# Patient Record
Sex: MALE | Race: ASIAN | NOT HISPANIC OR LATINO | URBAN - METROPOLITAN AREA
[De-identification: names, ages, dates, MRNs, and addresses within clinical notes are randomized per-mention and may not be internally consistent; named-entity substitution may affect disease eponyms.]

---

## 2018-11-01 ENCOUNTER — OUTPATIENT (OUTPATIENT)
Dept: OUTPATIENT SERVICES | Facility: HOSPITAL | Age: 38
LOS: 1 days | End: 2018-11-01
Payer: MEDICAID

## 2018-11-01 PROCEDURE — G9001: CPT

## 2018-11-08 ENCOUNTER — EMERGENCY (EMERGENCY)
Facility: HOSPITAL | Age: 38
LOS: 1 days | Discharge: HOME | End: 2018-11-08
Attending: EMERGENCY MEDICINE | Admitting: EMERGENCY MEDICINE
Payer: MEDICAID

## 2018-11-08 VITALS
TEMPERATURE: 98 F | OXYGEN SATURATION: 100 % | HEART RATE: 60 BPM | RESPIRATION RATE: 18 BRPM | DIASTOLIC BLOOD PRESSURE: 84 MMHG | SYSTOLIC BLOOD PRESSURE: 136 MMHG

## 2018-11-08 VITALS
RESPIRATION RATE: 17 BRPM | DIASTOLIC BLOOD PRESSURE: 85 MMHG | SYSTOLIC BLOOD PRESSURE: 145 MMHG | TEMPERATURE: 98 F | OXYGEN SATURATION: 99 % | HEART RATE: 70 BPM

## 2018-11-08 LAB
ALBUMIN SERPL ELPH-MCNC: 4.6 G/DL — SIGNIFICANT CHANGE UP (ref 3.3–5)
ALP SERPL-CCNC: 74 U/L — SIGNIFICANT CHANGE UP (ref 40–120)
ALT FLD-CCNC: 42 U/L — HIGH (ref 4–41)
APTT BLD: 32.4 SEC — SIGNIFICANT CHANGE UP (ref 27.5–36.3)
AST SERPL-CCNC: 26 U/L — SIGNIFICANT CHANGE UP (ref 4–40)
BASOPHILS # BLD AUTO: 0.05 K/UL — SIGNIFICANT CHANGE UP (ref 0–0.2)
BASOPHILS NFR BLD AUTO: 0.5 % — SIGNIFICANT CHANGE UP (ref 0–2)
BILIRUB SERPL-MCNC: 0.3 MG/DL — SIGNIFICANT CHANGE UP (ref 0.2–1.2)
BUN SERPL-MCNC: 15 MG/DL — SIGNIFICANT CHANGE UP (ref 7–23)
CALCIUM SERPL-MCNC: 9.8 MG/DL — SIGNIFICANT CHANGE UP (ref 8.4–10.5)
CHLORIDE SERPL-SCNC: 103 MMOL/L — SIGNIFICANT CHANGE UP (ref 98–107)
CO2 SERPL-SCNC: 26 MMOL/L — SIGNIFICANT CHANGE UP (ref 22–31)
CREAT SERPL-MCNC: 1.06 MG/DL — SIGNIFICANT CHANGE UP (ref 0.5–1.3)
EOSINOPHIL # BLD AUTO: 0.31 K/UL — SIGNIFICANT CHANGE UP (ref 0–0.5)
EOSINOPHIL NFR BLD AUTO: 3.3 % — SIGNIFICANT CHANGE UP (ref 0–6)
GLUCOSE SERPL-MCNC: 81 MG/DL — SIGNIFICANT CHANGE UP (ref 70–99)
HCT VFR BLD CALC: 46 % — SIGNIFICANT CHANGE UP (ref 39–50)
HGB BLD-MCNC: 15 G/DL — SIGNIFICANT CHANGE UP (ref 13–17)
IMM GRANULOCYTES # BLD AUTO: 0.03 # — SIGNIFICANT CHANGE UP
IMM GRANULOCYTES NFR BLD AUTO: 0.3 % — SIGNIFICANT CHANGE UP (ref 0–1.5)
INR BLD: 0.93 — SIGNIFICANT CHANGE UP (ref 0.88–1.17)
LYMPHOCYTES # BLD AUTO: 3.13 K/UL — SIGNIFICANT CHANGE UP (ref 1–3.3)
LYMPHOCYTES # BLD AUTO: 33.8 % — SIGNIFICANT CHANGE UP (ref 13–44)
MCHC RBC-ENTMCNC: 28.4 PG — SIGNIFICANT CHANGE UP (ref 27–34)
MCHC RBC-ENTMCNC: 32.6 % — SIGNIFICANT CHANGE UP (ref 32–36)
MCV RBC AUTO: 87 FL — SIGNIFICANT CHANGE UP (ref 80–100)
MONOCYTES # BLD AUTO: 0.61 K/UL — SIGNIFICANT CHANGE UP (ref 0–0.9)
MONOCYTES NFR BLD AUTO: 6.6 % — SIGNIFICANT CHANGE UP (ref 2–14)
NEUTROPHILS # BLD AUTO: 5.13 K/UL — SIGNIFICANT CHANGE UP (ref 1.8–7.4)
NEUTROPHILS NFR BLD AUTO: 55.5 % — SIGNIFICANT CHANGE UP (ref 43–77)
NRBC # FLD: 0 — SIGNIFICANT CHANGE UP
PLATELET # BLD AUTO: 207 K/UL — SIGNIFICANT CHANGE UP (ref 150–400)
PMV BLD: 10.7 FL — SIGNIFICANT CHANGE UP (ref 7–13)
POTASSIUM SERPL-MCNC: 4.2 MMOL/L — SIGNIFICANT CHANGE UP (ref 3.5–5.3)
POTASSIUM SERPL-SCNC: 4.2 MMOL/L — SIGNIFICANT CHANGE UP (ref 3.5–5.3)
PROT SERPL-MCNC: 8 G/DL — SIGNIFICANT CHANGE UP (ref 6–8.3)
PROTHROM AB SERPL-ACNC: 10.6 SEC — SIGNIFICANT CHANGE UP (ref 9.8–13.1)
RBC # BLD: 5.29 M/UL — SIGNIFICANT CHANGE UP (ref 4.2–5.8)
RBC # FLD: 12.9 % — SIGNIFICANT CHANGE UP (ref 10.3–14.5)
SODIUM SERPL-SCNC: 139 MMOL/L — SIGNIFICANT CHANGE UP (ref 135–145)
TROPONIN T, HIGH SENSITIVITY: < 6 NG/L — SIGNIFICANT CHANGE UP (ref ?–14)
WBC # BLD: 9.26 K/UL — SIGNIFICANT CHANGE UP (ref 3.8–10.5)
WBC # FLD AUTO: 9.26 K/UL — SIGNIFICANT CHANGE UP (ref 3.8–10.5)

## 2018-11-08 PROCEDURE — 71046 X-RAY EXAM CHEST 2 VIEWS: CPT | Mod: 26

## 2018-11-08 PROCEDURE — 93010 ELECTROCARDIOGRAM REPORT: CPT

## 2018-11-08 PROCEDURE — 99284 EMERGENCY DEPT VISIT MOD MDM: CPT | Mod: 25

## 2018-11-08 RX ORDER — IBUPROFEN 200 MG
600 TABLET ORAL ONCE
Qty: 0 | Refills: 0 | Status: COMPLETED | OUTPATIENT
Start: 2018-11-08 | End: 2018-11-08

## 2018-11-08 RX ORDER — ASPIRIN/CALCIUM CARB/MAGNESIUM 324 MG
162 TABLET ORAL ONCE
Qty: 0 | Refills: 0 | Status: COMPLETED | OUTPATIENT
Start: 2018-11-08 | End: 2018-11-08

## 2018-11-08 RX ADMIN — Medication 600 MILLIGRAM(S): at 11:37

## 2018-11-08 RX ADMIN — Medication 162 MILLIGRAM(S): at 11:37

## 2018-11-08 NOTE — ED PROVIDER NOTE - MEDICAL DECISION MAKING DETAILS
Cabot: 38M with no PMH/PSH who comes in with L sided chest/rib pain since yesterday.  Has had this issue before weeks ago, but was not worked up for it, and it is now worse.  No F/C/cough, no N/V/diaph, no prior PE/DVT, LE edema, immob, cancers.  On exam, HDS, NAD, MMM, eyes clear, lungs CTAB, TTP at L anterior lower ribs, no rash, heart sounds normal, abd soft, NT, ND, no CVAT, LEs without edema, wwp, skin normal temperature and color, neuro: alert and oriented, no focal deficits.  Will check basic labs, trops, CXR, EKG, give ASA, reassess.

## 2018-11-08 NOTE — ED PROVIDER NOTE - NSFOLLOWUPINSTRUCTIONS_ED_ALL_ED_FT
You have been seen for L sided rib pain.  We did not see any sign of fracture or rash, and your EKG and labs were normal.  It is unclear what is causing your pain at this time.  Please take tylenol and advil for pain, and you may apply ice to the area.    Also, it is VERY important that you call the cardiology office today to make an appointment to be seen as soon as possible.  Call [phone] to schedule an appointment ASAP. Most patients can be seen within 48 hours. Mention that you were just discharged from the emergency room and need to be seen immediately.     Please come back to the ED if you experience worsening pain, fever, loss of consciousness, or other concerns.

## 2018-11-08 NOTE — ED PROVIDER NOTE - PHYSICAL EXAMINATION
On exam, HDS, NAD, MMM, eyes clear, lungs CTAB, TTP at L anterior lower ribs, no rash, heart sounds normal, abd soft, NT, ND, no CVAT, LEs without edema, wwp, skin normal temperature and color, neuro: alert and oriented, no focal deficits.

## 2018-11-08 NOTE — ED PROVIDER NOTE - OBJECTIVE STATEMENT
39 y/o M w/ no pertinent PMH presents to ED c/o L sided rib pain rated 8/10 in severity s/p cleaning yesterday. Pt states the pain is worsened with palpation. Pain is not worsened with taking a deep breathing or walking. Pt states he felt a similar pain several weeks ago, which then subsided. Pt states this pain is worse in severity than the one he felt previously.  Denies any family history of cardiac diseases, or any other acute complaints. NKDA. 37 y/o M w/ no pertinent PMH presents to ED c/o L sided rib pain rated 8/10 in severity s/p cleaning yesterday. Pt states the pain is worsened with palpation. Pain is not worsened with taking a deep breath or walking. Pt states he felt a similar pain several weeks ago, which then subsided. Pt states this pain is worse in severity than the one he felt previously.  Pt notes to a productive cough, but denies any family history of cardiac diseases, history of blood clots in legs or lungs,  fevers, chills, or any other acute complaints. NKDA. 39 y/o Hungarian M w/ no pertinent PMH presents to ED c/o L sided rib pain rated 8/10 in severity since yesterday. Pt states the pain is worsened with palpation. Not pleuritic or exertional.  No N/V/diaph.  No F/C/cough.  Pain is not worsened with taking a deep breath or walking. Pt states he felt a similar pain several weeks ago, which then subsided. Pt states this pain is worse in severity than the one he felt previously.  Denies any family history of cardiac diseases, history of blood clots in legs or lungs,  fevers, chills, or any other acute complaints. NKDA. 39 y/o Turkish M w/ no pertinent PMH, former smoker - quit 6 years ago - who presents to ED c/o L sided rib pain rated 8/10 in severity constantly since yesterday. Pt states the pain is worsened with palpation. Not pleuritic or exertional.  No N/V/diaph.  No F/C/cough.  Pain is not worsened with taking a deep breath or walking. Pt states he felt a similar pain several weeks ago, which then subsided. Pt states this pain is worse in severity than the one he felt previously.  Denies any family history of cardiac diseases, history of blood clots in legs or lungs,  fevers, chills, or any other acute complaints. NKDA.

## 2018-11-08 NOTE — ED PROVIDER NOTE - ATTENDING CONTRIBUTION TO CARE
I, Jennifer Cabot, MD, have performed a history and physical exam of the patient and discussed their management with the ACP.  I reviewed the PA's note and agree with the documented findings and plan of care. My medical decision making and observations are found above.    Cabot: 38M with no PMH/PSH who comes in with L sided chest/rib pain since yesterday.  Has had this issue before weeks ago, but was not worked up for it, and it is now worse.  No F/C/cough, no N/V/diaph, no prior PE/DVT, LE edema, immob, cancers.  On exam, HDS, NAD, MMM, eyes clear, lungs CTAB, TTP at L anterior lower ribs, no rash, heart sounds normal, abd soft, NT, ND, no CVAT, LEs without edema, wwp, skin normal temperature and color, neuro: alert and oriented, no focal deficits.  Will check basic labs, trops, CXR, EKG, give ASA, reassess.

## 2018-11-14 DIAGNOSIS — Z71.89 OTHER SPECIFIED COUNSELING: ICD-10-CM

## 2019-11-21 ENCOUNTER — EMERGENCY (EMERGENCY)
Facility: HOSPITAL | Age: 39
LOS: 1 days | Discharge: HOME | End: 2019-11-21
Admitting: EMERGENCY MEDICINE
Payer: MEDICAID

## 2019-11-21 VITALS
HEART RATE: 75 BPM | TEMPERATURE: 98 F | DIASTOLIC BLOOD PRESSURE: 76 MMHG | SYSTOLIC BLOOD PRESSURE: 121 MMHG | RESPIRATION RATE: 18 BRPM

## 2019-11-21 VITALS
OXYGEN SATURATION: 100 % | HEART RATE: 66 BPM | RESPIRATION RATE: 18 BRPM | DIASTOLIC BLOOD PRESSURE: 76 MMHG | SYSTOLIC BLOOD PRESSURE: 120 MMHG

## 2019-11-21 PROCEDURE — 71046 X-RAY EXAM CHEST 2 VIEWS: CPT | Mod: 26

## 2019-11-21 PROCEDURE — 99284 EMERGENCY DEPT VISIT MOD MDM: CPT

## 2019-11-21 RX ORDER — IPRATROPIUM/ALBUTEROL SULFATE 18-103MCG
3 AEROSOL WITH ADAPTER (GRAM) INHALATION
Refills: 0 | Status: COMPLETED | OUTPATIENT
Start: 2019-11-21 | End: 2019-11-21

## 2019-11-21 RX ADMIN — Medication 100 MILLIGRAM(S): at 11:32

## 2019-11-21 RX ADMIN — Medication 50 MILLIGRAM(S): at 11:33

## 2019-11-21 RX ADMIN — Medication 3 MILLILITER(S): at 11:55

## 2019-11-21 RX ADMIN — Medication 3 MILLILITER(S): at 11:32

## 2019-11-21 RX ADMIN — Medication 3 MILLILITER(S): at 12:25

## 2019-11-21 NOTE — ED PROVIDER NOTE - PROGRESS NOTE DETAILS
PA Flannery- Patient reassessed s/p 1 neb wheeze already improved, only slight Rt lung base wheeze still noted. Will give another neb in 20 mins, will continue with cxr and reassess. PA Flannery- Lungs improved, patient states sx improved. CXR normal. Pt states he already has nebulizer solution at home. Will dc home with prednisone, tessalon pearles. Pt given allergy and pulm follow up advised to f/u with PCP also. All questions and concerns addressed. Strict return instructions given.

## 2019-11-21 NOTE — ED ADULT TRIAGE NOTE - CHIEF COMPLAINT QUOTE
Pt c/o cough and allergies x3 months--pt states he takes medicine but its not helping  Pt also c/o upper back pain

## 2019-11-21 NOTE — ED PROVIDER NOTE - NSFOLLOWUPCLINICS_GEN_ALL_ED_FT
[facility] Pulmonolgy and Sleep Medicine  Pulmonology  [address], [address]  [place] [address]  Phone: [phone]  Fax:   Follow Up Time: 1-3 Days    [facility] Allergy and Immunology  Allergy  [address] [place] [address]  Phone: [phone]  Fax:   Follow Up Time: 1-3 Days

## 2019-11-21 NOTE — ED PROVIDER NOTE - CLINICAL SUMMARY MEDICAL DECISION MAKING FREE TEXT BOX
Patient is a 39 y.o male with PMHx of allergies, former smoker who presents to ED c/o intermittent cough x 2.5 months along with post nasal drip and "chest congestion". DDx- allergies, asthma, bronchitis will r/o pna. Plan- nebs, cxr, prednisone, tessalon pearles.

## 2019-11-21 NOTE — ED PROVIDER NOTE - PHYSICAL EXAMINATION
Vital signs reviewed.   CONSTITUTIONAL: Well-appearing; well-nourished; in no apparent distress. Non-toxic appearing.   HEAD: Normocephalic, atraumatic.  EYES: PERRL, EOM intact, conjunctiva and sclera WNL.  ENT: normal nose; no rhinorrhea; normal pharynx with no tonsillar hypertrophy, no erythema, no exudate, no lymphadenopathy.  NECK/LYMPH: Supple; non-tender; no cervical lymphadenopathy.  CARD: Normal S1, S2; no murmurs, rubs, or gallops noted.  RESP: Normal chest excursion with respiration; +wheeze noted worse on Rt side than Lt.   EXT/MS: moves all extremities; distal pulses are normal, no pedal edema. No calf tenderness noted.   SKIN: Normal for age and race; warm; dry; good turgor; no apparent lesions or exudate noted.  NEURO: Awake, alert, oriented x 3, no gross deficits  PSYCH: Normal mood; appropriate affect.

## 2019-11-21 NOTE — ED ADULT NURSE NOTE - OBJECTIVE STATEMENT
Pt received in intake 7.  Pt is AOX4, skin warm, dry and intact.  c/o cough x few days.  Mild expiratory wheezing noted to lower lobes.  Peak Flow 250.  Pt medicated, currently on HFN. Continue to monitor.

## 2019-11-21 NOTE — ED PROVIDER NOTE - OBJECTIVE STATEMENT
HPI: Patient is a 39 y.o male with PMHx of allergies, former smoker who presents to ED c/o intermittent cough x 2.5 months along with post nasal drip and "chest congestion". Pt states that he has hx of allergies to pollen and dust, initially had a cough and congestion, saw his PCP who started him on allergy medicine (singular)  and nasal spray. States he had some relief but cough continued, states he returned to his PCP office as he was having some chest congestion, felt like "asthma" and was given an inhaler which he states provided relief of sx but still feels he is wheezing and has cough. Pt does note some sick contacts at home with cough as well. Pt denies chest pain. Denies LE swelling, recent travel, n/v/d, fevers, chills, back pain, abdominal pain, calf pain or any other complaints.

## 2019-11-21 NOTE — ED ADULT NURSE NOTE - NSIMPLEMENTINTERV_GEN_ALL_ED
Implemented All Universal Safety Interventions:  [place] to call system. Call bell, personal items and telephone within reach. Instruct patient to call for assistance. Room bathroom lighting operational. Non-slip footwear when patient is off stretcher. Physically safe environment: no spills, clutter or unnecessary equipment. Stretcher in lowest position, wheels locked, appropriate side rails in place.

## 2019-11-21 NOTE — ED PROVIDER NOTE - PATIENT PORTAL LINK FT
You can access the FollowMyHealth Patient Portal offered by [facility] by registering at the following website: [email or web address]. By joining [employer]’s FollowMyHealth portal, you will also be able to view your health information using other applications (apps) compatible with our system.

## 2020-01-22 NOTE — ED ADULT NURSE NOTE - CHIEF COMPLAINT QUOTE
pt c/o L sided rib pain after cleaning. pain noted to be worse with ROM and palpation. denies CP.
bladder urine for cx

## 2020-01-25 PROBLEM — Z00.00 ENCOUNTER FOR PREVENTIVE HEALTH EXAMINATION: Status: ACTIVE | Noted: 2020-01-25

## 2020-01-28 ENCOUNTER — EMERGENCY (EMERGENCY)
Facility: HOSPITAL | Age: 40
LOS: 1 days | Discharge: HOME | End: 2020-01-28
Admitting: EMERGENCY MEDICINE
Payer: MEDICAID

## 2020-01-28 VITALS
DIASTOLIC BLOOD PRESSURE: 86 MMHG | OXYGEN SATURATION: 98 % | HEART RATE: 91 BPM | TEMPERATURE: 99 F | SYSTOLIC BLOOD PRESSURE: 135 MMHG | RESPIRATION RATE: 18 BRPM

## 2020-01-28 LAB
FLU A RESULT: DETECTED — HIGH
FLU A RESULT: DETECTED — HIGH
FLUAV AG NPH QL: DETECTED — HIGH
FLUBV AG NPH QL: NOT DETECTED — SIGNIFICANT CHANGE UP
RSV RESULT: SIGNIFICANT CHANGE UP
RSV RNA RESP QL NAA+PROBE: SIGNIFICANT CHANGE UP

## 2020-01-28 PROCEDURE — 71046 X-RAY EXAM CHEST 2 VIEWS: CPT | Mod: 26

## 2020-01-28 PROCEDURE — 99283 EMERGENCY DEPT VISIT LOW MDM: CPT

## 2020-01-28 NOTE — ED ADULT TRIAGE NOTE - CHIEF COMPLAINT QUOTE
Patient complains of sore throat, fevers, chills, cough, headaches & and body aches. Also reports both eyes feel "burning", appear reddened but no discharge. Took Tylenol last this morning.  States his daughter had the flu 2 weeks ago.  Vitals stable, no apparent distress noted.  Mask applied.

## 2020-01-28 NOTE — ED PROVIDER NOTE - NSFOLLOWUPINSTRUCTIONS_ED_ALL_ED_FT
Advance activity as tolerated.  Continue all previously prescribed medications as directed unless otherwise instructed.  Follow up with your primary care physician in 48-72 hours- bring copies of your results.  Return to the ER for worsening or persistent symptoms, and/or ANY NEW OR CONCERNING SYMPTOMS. If you have issues obtaining follow up, please call: [phone] to obtain a doctor or specialist who takes your insurance in your area.  You may call [phone] to make an appointment with the internal medicine clinic.

## 2020-01-28 NOTE — ED PROVIDER NOTE - PATIENT PORTAL LINK FT
You can access the FollowMyHealth Patient Portal offered by [facility] by registering at the following website: [email or web address]. By joining [employer]’s FollowMyHealth portal, you will also be able to view your health information using other applications (apps) compatible with our system.

## 2020-01-28 NOTE — ED PROVIDER NOTE - OBJECTIVE STATEMENT
40 y/o M denies pmh c/o fever, lethargy, nonproductive cough, sore throat x yesterday. Pt reports a nonproductive cough x 3 months, began to get worse yesterday. Fever of 101F at home, took tylenol with relief. Reports sick contact with daughter who had the flu about 2 weeks ago. Denies recent travel, cp, sob, abd pain, n/v/d.

## 2020-01-30 ENCOUNTER — APPOINTMENT (OUTPATIENT)
Dept: PEDIATRIC ALLERGY IMMUNOLOGY | Facility: CLINIC | Age: 40
End: 2020-01-30
Payer: MEDICAID

## 2020-01-30 VITALS
SYSTOLIC BLOOD PRESSURE: 140 MMHG | HEART RATE: 78 BPM | DIASTOLIC BLOOD PRESSURE: 83 MMHG | HEIGHT: 71 IN | BODY MASS INDEX: 26.04 KG/M2 | WEIGHT: 186 LBS | OXYGEN SATURATION: 94 %

## 2020-01-30 DIAGNOSIS — R06.02 SHORTNESS OF BREATH: ICD-10-CM

## 2020-01-30 DIAGNOSIS — Z87.891 PERSONAL HISTORY OF NICOTINE DEPENDENCE: ICD-10-CM

## 2020-01-30 DIAGNOSIS — R06.2 WHEEZING: ICD-10-CM

## 2020-01-30 DIAGNOSIS — E78.00 PURE HYPERCHOLESTEROLEMIA, UNSPECIFIED: ICD-10-CM

## 2020-01-30 DIAGNOSIS — H10.13 ACUTE ATOPIC CONJUNCTIVITIS, BILATERAL: ICD-10-CM

## 2020-01-30 PROCEDURE — 95004 PERQ TESTS W/ALRGNC XTRCS: CPT

## 2020-01-30 PROCEDURE — 99204 OFFICE O/P NEW MOD 45 MIN: CPT | Mod: 25

## 2020-01-30 RX ORDER — ATORVASTATIN CALCIUM 80 MG/1
TABLET, FILM COATED ORAL
Refills: 0 | Status: ACTIVE | COMMUNITY

## 2020-01-30 RX ORDER — KETOTIFEN FUMARATE 0.25 MG/ML
0.03 SOLUTION OPHTHALMIC
Qty: 1 | Refills: 2 | Status: ACTIVE | COMMUNITY
Start: 2020-01-30 | End: 1900-01-01

## 2020-01-30 RX ORDER — AZELASTINE HYDROCHLORIDE 137 UG/1
137 SPRAY, METERED NASAL
Qty: 1 | Refills: 0 | Status: ACTIVE | COMMUNITY
Start: 2020-01-30 | End: 1900-01-01

## 2020-01-30 NOTE — REVIEW OF SYSTEMS
[Nl] : Genitourinary [FreeTextEntry3] : see HPI [FreeTextEntry6] : see HPI [FreeTextEntry4] : see HPI

## 2020-01-30 NOTE — HISTORY OF PRESENT ILLNESS
[Asthma] : asthma [Food Allergies] : food allergies [de-identified] : 39 year old male who comes in for an initial evaluation after referral from the Emergency Room (ER).  The patient was seen for the first time in ER, two months for a bothersome cough.  In the ER, the patient had a Chest X Ray which was thought to be normal.  Prior to this, the patient was seen by Dr. Bailey Lynne and treated with several antihistamines with only partial relief;  the patient was also treated prior, with intranasal steroids, which again were partially helpful only.  The symptoms of cough partially improved after the ER visit with no clear treatment, but worsened again earlier this week when patient was again seen in the ER for cough, fever, and shortness of breath.  The patient was then diagnosed with influenza and supportive care was suggested. \par There is associated pruritus of the eyes, nasal congestion, and post nasal drip that are associated with the cough.  There is also associated shortness of breath with flares of the cough with intermittent wheeze that has been noted. The cough is reported to occur every winter season for 8 years, and lasting for several months.  In the past, when patient has been treated with oral antihistamines and intranasal steroids with improvement of the symptoms.\par When eating red meat sometimes, or eating lots of dessert, there is associated chest congestion and shortness of breath.  At other times, this does not happen.

## 2020-01-30 NOTE — CONSULT LETTER
[Dear  ___] : Dear  [unfilled], [Courtesy Letter:] : I had the pleasure of seeing your patient, [unfilled], in my office today. [Please see my note below.] : Please see my note below. [Consult Closing:] : Thank you very much for allowing me to participate in the care of this patient.  If you have any questions, please do not hesitate to contact me. [Sincerely,] : Sincerely, [FreeTextEntry2] : Bailey Lynne MD [FreeTextEntry3] : Sandra Granger MD, FAAAAI, FACMAURICIOI\par Associate , \par Assistant Fellowship Training ,\par Director, [facility] and [facility]\par Division of Allergy and Immunology\par [facility]\par [facility]\par , Pediatrics and Medicine\par [facility]\par [address], [address]\[place] [address]\par [phone]\par

## 2020-01-30 NOTE — SOCIAL HISTORY
[House] : [unfilled] lives in a house  [Cockroaches] : Patient states that there are cockroaches in the home [None] : none [Dust Mite Covers] : does not have dust mite covers [Smokers in Household] : there are no smokers in the home

## 2020-01-30 NOTE — PHYSICAL EXAM
[Alert] : alert [Well Nourished] : well nourished [Healthy Appearance] : healthy appearance [No Acute Distress] : no acute distress [Well Developed] : well developed [Normal Pupil & Iris Size/Symmetry] : normal pupil and iris size and symmetry [No Discharge] : no discharge [No Photophobia] : no photophobia [Sclera Not Icteric] : sclera not icteric [Normal TMs] : both tympanic membranes were normal [Normal Lips/Tongue] : the lips and tongue were normal [Normal Outer Ear/Nose] : the ears and nose were normal in appearance [Normal Tonsils] : normal tonsils [No Thrush] : no thrush [No Oral Lesions or Ulcers] : no oral lesions or ulcers [Boggy Nasal Turbinates] : boggy and/or pale nasal turbinates [Posterior Pharyngeal Cobblestoning] : posterior pharyngeal cobblestoning [Supple] : the neck was supple [Normal Rate and Effort] : normal respiratory rhythm and effort [No Crackles] : no crackles [No Retractions] : no retractions [Bilateral Audible Breath Sounds] : bilateral audible breath sounds [Normal Rate] : heart rate was normal  [Normal S1, S2] : normal S1 and S2 [No murmur] : no murmur [Regular Rhythm] : with a regular rhythm [Soft] : abdomen soft [Not Tender] : non-tender [Not Distended] : not distended [Skin Intact] : skin intact  [No Rash] : no rash [No Skin Lesions] : no skin lesions [No clubbing] : no clubbing [No Edema] : no edema [No Cyanosis] : no cyanosis [Normal Mood] : mood was normal [Normal Affect] : affect was normal [Alert, Awake, Oriented as Age-Appropriate] : alert, awake, oriented as age appropriate [Eczematous Patches] : no eczematous patches [de-identified] : inspiratory wheeze intermittnetly

## 2020-02-06 ENCOUNTER — EMERGENCY (EMERGENCY)
Facility: HOSPITAL | Age: 40
LOS: 1 days | Discharge: HOME | End: 2020-02-06
Attending: STUDENT IN AN ORGANIZED HEALTH CARE EDUCATION/TRAINING PROGRAM | Admitting: STUDENT IN AN ORGANIZED HEALTH CARE EDUCATION/TRAINING PROGRAM
Payer: MEDICAID

## 2020-02-06 VITALS
TEMPERATURE: 98 F | OXYGEN SATURATION: 100 % | RESPIRATION RATE: 16 BRPM | SYSTOLIC BLOOD PRESSURE: 142 MMHG | DIASTOLIC BLOOD PRESSURE: 80 MMHG | HEART RATE: 86 BPM

## 2020-02-06 VITALS
OXYGEN SATURATION: 100 % | DIASTOLIC BLOOD PRESSURE: 62 MMHG | SYSTOLIC BLOOD PRESSURE: 111 MMHG | RESPIRATION RATE: 16 BRPM | TEMPERATURE: 98 F | HEART RATE: 62 BPM

## 2020-02-06 PROCEDURE — 99284 EMERGENCY DEPT VISIT MOD MDM: CPT

## 2020-02-06 PROCEDURE — 71046 X-RAY EXAM CHEST 2 VIEWS: CPT | Mod: 26

## 2020-02-06 RX ORDER — ALBUTEROL 90 UG/1
1 AEROSOL, METERED ORAL EVERY 4 HOURS
Refills: 0 | Status: DISCONTINUED | OUTPATIENT
Start: 2020-02-06 | End: 2020-02-11

## 2020-02-06 RX ORDER — KETOROLAC TROMETHAMINE 30 MG/ML
30 SYRINGE (ML) INJECTION ONCE
Refills: 0 | Status: DISCONTINUED | OUTPATIENT
Start: 2020-02-06 | End: 2020-02-06

## 2020-02-06 RX ORDER — IPRATROPIUM/ALBUTEROL SULFATE 18-103MCG
3 AEROSOL WITH ADAPTER (GRAM) INHALATION ONCE
Refills: 0 | Status: COMPLETED | OUTPATIENT
Start: 2020-02-06 | End: 2020-02-06

## 2020-02-06 RX ADMIN — Medication 30 MILLIGRAM(S): at 13:20

## 2020-02-06 RX ADMIN — Medication 3 MILLILITER(S): at 13:20

## 2020-02-06 NOTE — ED PROVIDER NOTE - NS ED ROS FT
CONSTITUTIONAL: No fevers, no chills, no lightheadedness, no dizziness  Eyes: no visual changes  Ears: no ear drainage, no ear pain  Nose: no nasal congestion  Mouth/Throat: no sore throat  CV: No chest pain, no palpitations  PULM: +SOB, cough  GI: No n/v/d, no abd pain  : no dysuria, no hematuria  SKIN: no rashes.  NEURO: no headache, no focal weakness or numbness  LYMPH/VASC: no LE swelling

## 2020-02-06 NOTE — ED PROVIDER NOTE - CARE PLAN
Principal Discharge DX:	Viral illness Principal Discharge DX:	Influenza  Secondary Diagnosis:	Shortness of breath

## 2020-02-06 NOTE — ED PROVIDER NOTE - OBJECTIVE STATEMENT
40 y/o M with PMH of HLD presenting for ongoing fever, cough, and myalgia. Patient was seen on 1/28 and diagnosed with flu and DC. Patient has fever with tmax of 101.5. Cough productive with yellow mucus and slight SOB. Denies n/v/d, cp.

## 2020-02-06 NOTE — ED PROVIDER NOTE - CLINICAL SUMMARY MEDICAL DECISION MAKING FREE TEXT BOX
40 y/o M presenting with ongoing flu symptoms. Concern for superimposed pneumonia will obtain CXR. Nebs for wheezing. Likely DC home w or w/o abx pending results. - - Darin Muñoz,  PGY-1

## 2020-02-06 NOTE — ED ADULT TRIAGE NOTE - CHIEF COMPLAINT QUOTE
Pt c/o fever, cough with yellow phlegm, neck and shldr pain x 10 days.  Pt seen in ED 1/28 dx with the flu

## 2020-02-06 NOTE — ED PROVIDER NOTE - PHYSICAL EXAMINATION
gen: well appearing  Mentation: AAO x 3  psych: mood appropriate  ENT: airway patent  Eyes: conjunctivae clear bilaterally  Cardio: RRR, no m/r/g  Resp: wheezing in b/l lung fields  GI: s/nt/nd   Neuro: sensation and motor function intact  Skin: No evidence of rash  MSK: normal movement of all extremities  Lymph/Vasc: no LE edema

## 2020-02-06 NOTE — ED PROVIDER NOTE - PATIENT PORTAL LINK FT
You can access the FollowMyHealth Patient Portal offered by [facility] by registering at the following website: [email or web address]. By joining [employer]’s FollowMyHealth portal, you will also be able to view your health information using other applications (apps) compatible with our system.

## 2020-02-06 NOTE — ED PROVIDER NOTE - ATTENDING CONTRIBUTION TO CARE
Angelina Villafuerte M.D: 39yoM diagnosed 1 week ago with influenza, presents now for persistent flu like symptoms including fever, myalgias, cough, shortness of breath, but notes symptoms improving. on exam in no distress, but diffuse wheezing and crackles in lung fields. likely still flu like symptosm, but concern for superimposed pna. plan for neb treatment, xr, reassess.

## 2020-02-06 NOTE — ED PROVIDER NOTE - RAPID ASSESSMENT
38 y/o M presents to ED with fever (tmax:101.5), cough, neck pain, and shoulder pain since 11 days. Pt seen in ED 1/28 and diagnosed with the flu. Cough with yellow phlegm. Reports slight improvement  of symptoms though persistent. Endorsing difficulty breathing. Denies having any nausea, vomiting, or other medical complaints.   Initial face to face assessment completed by . Patient is well appearing and in NAD. Initial orders placed for patient. Will hand off patient to a second provider for further evaluation and management. 40 y/o M presents to ED with fever (tmax:101.5), cough, neck pain, and shoulder pain since 11 days. Pt seen in ED 1/28 and diagnosed with the flu. Cough with yellow phlegm. Reports slight improvement  of symptoms though persistent. Endorsing difficulty breathing. Denies having any nausea, vomiting, or other medical complaints.   Initial face to face assessment completed by . Patient is well appearing and in NAD. Initial orders placed for patient. Will hand off patient to resident Darin Muñoz for further evaluation and management.

## 2020-02-06 NOTE — ED PROVIDER NOTE - NSFOLLOWUPINSTRUCTIONS_ED_ALL_ED_FT
PLEASE RETURN TO THE ER IF YOUR SYMPTOMS DO NOT RESOLVE IN 2-3 DAYS.    You have a viral syndrome. This can last from 5-10 days. Alternate Tylenol and Motrin every 4-6 hours for fever control as well as body aches and chills. Drink plenty of fluids. Rest. Return to the emergency room for worsening condition or new concerning symptoms. Follow up with your regular doctor. If you don't have a regular doctor use one of the numbers below to establish a primary care doctor.  A viral infection can be caused by different types of viruses. Most viral infections are not serious and resolve on their own. However, some infections may cause severe symptoms and may lead to further complications.SYMPTOMSViruses can frequently cause:Minor sore throat. Aches and pains. Headaches. Runny nose. Different types of rashes. Watery eyes. Tiredness. Cough. Loss of appetite. Gastrointestinal infections, resulting in nausea, vomiting, and diarrhea. These symptoms do not respond to antibiotics because the infection is not caused by bacteria. However, you might catch a bacterial infection following the viral infection. This is sometimes called a "superinfection." Symptoms of such a bacterial infection may include:Worsening sore throat with pus and difficulty swallowing. Swollen neck glands. Chills and a high or persistent fever. Severe headache. Tenderness over the sinuses. Persistent overall ill feeling (malaise), muscle aches, and tiredness (fatigue). Persistent cough. Yellow, green, or brown mucus production with coughing. HOME CARE INSTRUCTIONS Only take over-the-counter or prescription medicines for pain, discomfort, diarrhea, or fever as directed by your caregiver. Drink enough water and fluids to keep your urine clear or pale yellow. Sports drinks can provide valuable electrolytes, sugars, and hydration. Get plenty of rest and maintain proper nutrition. Soups and broths with crackers or rice are fine. SEEK IMMEDIATE MEDICAL CARE IF: You have severe headaches, shortness of breath, chest pain, neck pain, or an unusual rash. You have uncontrolled vomiting, diarrhea, or you are unable to keep down fluids.

## 2020-03-11 ENCOUNTER — APPOINTMENT (OUTPATIENT)
Dept: PEDIATRIC ALLERGY IMMUNOLOGY | Facility: CLINIC | Age: 40
End: 2020-03-11

## 2020-10-15 PROBLEM — E78.5 HYPERLIPIDEMIA, UNSPECIFIED: Chronic | Status: ACTIVE | Noted: 2020-02-06

## 2020-10-23 ENCOUNTER — EMERGENCY (EMERGENCY)
Facility: HOSPITAL | Age: 40
LOS: 1 days | Discharge: HOME | End: 2020-10-23
Attending: EMERGENCY MEDICINE | Admitting: EMERGENCY MEDICINE
Payer: MEDICAID

## 2020-10-23 VITALS
OXYGEN SATURATION: 96 % | SYSTOLIC BLOOD PRESSURE: 130 MMHG | RESPIRATION RATE: 16 BRPM | HEART RATE: 79 BPM | DIASTOLIC BLOOD PRESSURE: 77 MMHG | TEMPERATURE: 98 F

## 2020-10-23 LAB
B PERT DNA SPEC QL NAA+PROBE: SIGNIFICANT CHANGE UP
C PNEUM DNA SPEC QL NAA+PROBE: SIGNIFICANT CHANGE UP
FLUAV H1 2009 PAND RNA SPEC QL NAA+PROBE: SIGNIFICANT CHANGE UP
FLUAV H1 RNA SPEC QL NAA+PROBE: SIGNIFICANT CHANGE UP
FLUAV H3 RNA SPEC QL NAA+PROBE: SIGNIFICANT CHANGE UP
FLUAV SUBTYP SPEC NAA+PROBE: SIGNIFICANT CHANGE UP
FLUBV RNA SPEC QL NAA+PROBE: SIGNIFICANT CHANGE UP
HADV DNA SPEC QL NAA+PROBE: SIGNIFICANT CHANGE UP
HCOV PNL SPEC NAA+PROBE: SIGNIFICANT CHANGE UP
HMPV RNA SPEC QL NAA+PROBE: SIGNIFICANT CHANGE UP
HPIV1 RNA SPEC QL NAA+PROBE: SIGNIFICANT CHANGE UP
HPIV2 RNA SPEC QL NAA+PROBE: SIGNIFICANT CHANGE UP
HPIV3 RNA SPEC QL NAA+PROBE: SIGNIFICANT CHANGE UP
HPIV4 RNA SPEC QL NAA+PROBE: SIGNIFICANT CHANGE UP
RAPID RVP RESULT: SIGNIFICANT CHANGE UP
RSV RNA SPEC QL NAA+PROBE: SIGNIFICANT CHANGE UP
RV+EV RNA SPEC QL NAA+PROBE: SIGNIFICANT CHANGE UP
SARS-COV-2 RNA SPEC QL NAA+PROBE: SIGNIFICANT CHANGE UP

## 2020-10-23 PROCEDURE — 99283 EMERGENCY DEPT VISIT LOW MDM: CPT

## 2020-10-23 PROCEDURE — 71045 X-RAY EXAM CHEST 1 VIEW: CPT | Mod: 26

## 2020-10-23 RX ORDER — ALBUTEROL 90 UG/1
2 AEROSOL, METERED ORAL ONCE
Refills: 0 | Status: COMPLETED | OUTPATIENT
Start: 2020-10-23 | End: 2020-10-23

## 2020-10-23 RX ADMIN — Medication 60 MILLIGRAM(S): at 03:35

## 2020-10-23 RX ADMIN — ALBUTEROL 2 PUFF(S): 90 AEROSOL, METERED ORAL at 02:38

## 2020-10-23 NOTE — ED ADULT TRIAGE NOTE - CHIEF COMPLAINT QUOTE
Experiencing a cough for 2 wks with associated intermittent SOB, use albuterol inhaler with some improvement.  No fever, nausea, vomiting or chills.

## 2020-10-23 NOTE — ED PROVIDER NOTE - NSFOLLOWUPINSTRUCTIONS_ED_ALL_ED_FT
Follow up with Pulmonology within 1-2 days.    Continue taking oral prednisone 20 mg once daily for the next 4 days.    Continue using your albuterol inhaler every 4-6 hours as needed for wheezing, cough or shortness of breath.     Take Tessalon pearles for cough.     Return to the ER if you develop new or worsening trouble breathing, chest pain, pulse oximeter readings below 90%, fever above 100.4F, blueish discoloration around your lips or in your hands, inability to rouse or any other concerns.

## 2020-10-23 NOTE — ED PROVIDER NOTE - OBJECTIVE STATEMENT
40 y/M with no significant PMH presenting with 2 weeks of cough. It was productive at first, now dry. Reports he develops some mild dyspnea, chest discomfort and headaches after bouts of violent coughing. He denies personal hx of asthma. He reports he has dust allergies that cause him to cough and follows with an allergy specialist and is scheduled for another appointment in two weeks. He denies travel outside [place] or contact with anyone with COVID 19. He denies F/C, MARTÍNEZ, chest pain with exertion, abd pain, n/v/d, anosmia/dysgeusia. He reports normal stress testinga bout 1 year ago.

## 2020-10-23 NOTE — ED PROVIDER NOTE - CLINICAL SUMMARY MEDICAL DECISION MAKING FREE TEXT BOX
40 y/M with chief c/o 2 weeks persistent cough. With bronchospasms on exam. CXR is without acute infiltrates. SpO2 94-95% on RA at rest. Reports his pulse oximeter at home over the past day has noted readings around 92%. His wheezing and symptoms improved after medication in the ED. Patient ambulated with me in Intake, SpO2 remained at 94% with brief momentary drop to 93%, however he reported feeling well, was breathing and walking comfortably and was in no acute distress, and felt well enough for discharge home. He will continue on his albuterol in haler at home and continue on 4 days of oral steroids. Will d/c home with Pulm referral and return precautions.

## 2020-10-23 NOTE — ED PROVIDER NOTE - PATIENT PORTAL LINK FT
You can access the FollowMyHealth Patient Portal offered by [facility] by registering at the following website: [email or web address]. By joining [employer]’s FollowMyHealth portal, you will also be able to view your health information using other applications (apps) compatible with our system.

## 2020-10-23 NOTE — ED PROVIDER NOTE - ATTENDING CONTRIBUTION TO CARE
HPI: 40 y/M with no significant Past Medical History that presenting with 2 weeks of cough. It was productive at first, now dry. Reports he develops some mild dyspnea, chest discomfort and headaches after bouts of violent coughing. He denies personal hx of asthma. He reports he has dust allergies that cause him to cough and follows with an allergy specialist and is scheduled for another appointment in two weeks. He denies travel outside [place] or contact with anyone with COVID 19. He denies F/C, MARTÍNEZ, chest pain with exertion, abd pain, n/v/d, anosmia/dysgeusia. He reports normal stress/ECHO testing about 1 year ago.  EXAM: Speaking full sentences, NAD, no hoarse voice, no neck mass, uvula midline, lungs diffuse wheezing without rhonci or rales, no increased WOB, heart RRR, abd soft nontender, pulses palpable x 4 and equal, no pitting edema BLE.   MDM: pt with no Past Medical History that presents with cough x 2 wks, dry, no fever or other systemic symptoms found to have wheezing on exam but no resp distress/impending failure. Will obtain XR and provide meds and reassess. most likely allergic reaction as pt reports he has had this similar cough on/off x 6-7 years and has been seen by his PMD for this and has an allergy appt and pulm appt in near future. pt reports cough comes on when weather changes and with exposure to dust which he apparently had today at work. 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 Non smoker as he quit in 2012 after 10-12 years of smoking. unlikely URI as pt has no systemic symptoms. Unlikely PE or ACS as pt has no risk factors and reports had neg stress/echo last year.

## 2020-10-23 NOTE — ED PROVIDER NOTE - CPE EDP NEURO NORM
Tried calling patient but no answer. Looks like she was supposed to be changed to 137mcg? This request is for 125 mcg.   normal...

## 2020-11-04 ENCOUNTER — APPOINTMENT (OUTPATIENT)
Dept: PEDIATRIC ALLERGY IMMUNOLOGY | Facility: CLINIC | Age: 40
End: 2020-11-04
Payer: MEDICAID

## 2020-11-04 VITALS
BODY MASS INDEX: 25.38 KG/M2 | OXYGEN SATURATION: 97 % | WEIGHT: 182 LBS | DIASTOLIC BLOOD PRESSURE: 82 MMHG | TEMPERATURE: 97.2 F | HEART RATE: 73 BPM | SYSTOLIC BLOOD PRESSURE: 133 MMHG

## 2020-11-04 DIAGNOSIS — R05 COUGH: ICD-10-CM

## 2020-11-04 DIAGNOSIS — J30.89 OTHER ALLERGIC RHINITIS: ICD-10-CM

## 2020-11-04 DIAGNOSIS — J45.30 MILD PERSISTENT ASTHMA, UNCOMPLICATED: ICD-10-CM

## 2020-11-04 DIAGNOSIS — Z91.09 OTHER ALLERGY STATUS, OTHER THAN TO DRUGS AND BIOLOGICAL SUBSTANCES: ICD-10-CM

## 2020-11-04 PROCEDURE — 99214 OFFICE O/P EST MOD 30 MIN: CPT

## 2020-11-04 RX ORDER — FEXOFENADINE HYDROCHLORIDE 180 MG/1
180 TABLET ORAL DAILY
Qty: 1 | Refills: 2 | Status: ACTIVE | COMMUNITY
Start: 2020-01-30

## 2020-11-04 RX ORDER — FLUTICASONE PROPIONATE 50 UG/1
50 SPRAY, METERED NASAL DAILY
Qty: 1 | Refills: 3 | Status: ACTIVE | COMMUNITY
Start: 2020-01-30 | End: 1900-01-01

## 2020-11-06 PROBLEM — Z91.09 HOUSE DUST MITE ALLERGY: Status: ACTIVE | Noted: 2020-01-30

## 2020-11-06 PROBLEM — R05 COUGH: Status: ACTIVE | Noted: 2020-01-30

## 2020-11-06 PROBLEM — J45.30 MILD PERSISTENT ASTHMA: Status: ACTIVE | Noted: 2020-11-06

## 2020-11-06 PROBLEM — J30.89 ALLERGIC RHINITIS DUE TO DUST MITE: Status: ACTIVE | Noted: 2020-01-30

## 2020-11-09 NOTE — CONSULT LETTER
[Dear  ___] : Dear  [unfilled], [Courtesy Letter:] : I had the pleasure of seeing your patient, [unfilled], in my office today. [Please see my note below.] : Please see my note below. [Consult Closing:] : Thank you very much for allowing me to participate in the care of this patient.  If you have any questions, please do not hesitate to contact me. [Sincerely,] : Sincerely, [FreeTextEntry2] : Dr. Bailey Lynne [FreeTextEntry3] : Sandra Granger MD, FAAAAI, FACMAURICIOI\par Associate , \par Assistant Fellowship Training ,\par Director, [facility]\par Division of Allergy and Immunology\par [facility]\par [facility]\par , Pediatrics and Medicine\par [facility]\par [address], [address]\[place] [address]\par [phone]\par

## 2020-11-09 NOTE — END OF VISIT
[FreeTextEntry3] : Case discussed with PA; present through out visit and performed history, exam and supervised procedures of ALBERTO Munoz.\par

## 2020-11-09 NOTE — PHYSICAL EXAM
[Alert] : alert [Well Nourished] : well nourished [Healthy Appearance] : healthy appearance [No Acute Distress] : no acute distress [Well Developed] : well developed [Normal Pupil & Iris Size/Symmetry] : normal pupil and iris size and symmetry [No Discharge] : no discharge [No Photophobia] : no photophobia [Sclera Not Icteric] : sclera not icteric [Normal Lips/Tongue] : the lips and tongue were normal [Normal Outer Ear/Nose] : the ears and nose were normal in appearance [Normal Tonsils] : normal tonsils [No Thrush] : no thrush [Supple] : the neck was supple [Normal Rate and Effort] : normal respiratory rhythm and effort [No Crackles] : no crackles [No Retractions] : no retractions [Bilateral Audible Breath Sounds] : bilateral audible breath sounds [Normal Rate] : heart rate was normal  [Normal S1, S2] : normal S1 and S2 [No murmur] : no murmur [Regular Rhythm] : with a regular rhythm [Normal Cervical Lymph Nodes] : cervical [Skin Intact] : skin intact  [No Rash] : no rash [No Skin Lesions] : no skin lesions [No clubbing] : no clubbing [No Edema] : no edema [Normal Mood] : mood was normal [Normal Affect] : affect was normal [Alert, Awake, Oriented as Age-Appropriate] : alert, awake, oriented as age appropriate

## 2020-11-11 RX ORDER — FLUTICASONE PROPIONATE 110 UG/1
110 AEROSOL, METERED RESPIRATORY (INHALATION) TWICE DAILY
Qty: 1 | Refills: 2 | Status: DISCONTINUED | COMMUNITY
Start: 2020-11-04 | End: 2020-11-11

## 2020-11-28 DIAGNOSIS — Z01.818 ENCOUNTER FOR OTHER PREPROCEDURAL EXAMINATION: ICD-10-CM

## 2020-11-29 ENCOUNTER — APPOINTMENT (OUTPATIENT)
Dept: DISASTER EMERGENCY | Facility: CLINIC | Age: 40
End: 2020-11-29

## 2020-11-30 LAB — SARS-COV-2 N GENE NPH QL NAA+PROBE: NOT DETECTED

## 2020-12-02 ENCOUNTER — NON-APPOINTMENT (OUTPATIENT)
Age: 40
End: 2020-12-02

## 2020-12-02 ENCOUNTER — APPOINTMENT (OUTPATIENT)
Dept: PEDIATRIC ALLERGY IMMUNOLOGY | Facility: CLINIC | Age: 40
End: 2020-12-02
Payer: MEDICAID

## 2020-12-02 VITALS
HEIGHT: 71 IN | OXYGEN SATURATION: 97 % | TEMPERATURE: 96.3 F | BODY MASS INDEX: 24.64 KG/M2 | SYSTOLIC BLOOD PRESSURE: 123 MMHG | DIASTOLIC BLOOD PRESSURE: 77 MMHG | HEART RATE: 72 BPM | WEIGHT: 176 LBS

## 2020-12-02 DIAGNOSIS — J45.40 MODERATE PERSISTENT ASTHMA, UNCOMPLICATED: ICD-10-CM

## 2020-12-02 PROCEDURE — 99072 ADDL SUPL MATRL&STAF TM PHE: CPT

## 2020-12-02 PROCEDURE — 94060 EVALUATION OF WHEEZING: CPT

## 2020-12-02 PROCEDURE — 99213 OFFICE O/P EST LOW 20 MIN: CPT | Mod: 25

## 2020-12-02 RX ORDER — BECLOMETHASONE DIPROPIONATE HFA 80 UG/1
80 AEROSOL, METERED RESPIRATORY (INHALATION)
Qty: 1 | Refills: 1 | Status: DISCONTINUED | COMMUNITY
Start: 2020-11-11 | End: 2020-12-02

## 2020-12-02 RX ORDER — FLUTICASONE PROPIONATE AND SALMETEROL XINAFOATE 230; 21 UG/1; UG/1
230-21 AEROSOL, METERED RESPIRATORY (INHALATION)
Qty: 1 | Refills: 2 | Status: ACTIVE | COMMUNITY
Start: 2020-12-02 | End: 1900-01-01

## 2020-12-02 NOTE — PHYSICAL EXAM
[Alert] : alert [Well Nourished] : well nourished [Healthy Appearance] : healthy appearance [No Acute Distress] : no acute distress [Well Developed] : well developed [No Discharge] : no discharge [No Photophobia] : no photophobia [Sclera Not Icteric] : sclera not icteric [Normal Outer Ear/Nose] : the ears and nose were normal in appearance [Supple] : the neck was supple [Normal Rate and Effort] : normal respiratory rhythm and effort [No Retractions] : no retractions [Bilateral Audible Breath Sounds] : bilateral audible breath sounds [Normal Cervical Lymph Nodes] : cervical [Normal Axillary Lumph Nodes] : axillary [Skin Intact] : skin intact  [No Rash] : no rash [Normal Mood] : mood was normal [Normal Affect] : affect was normal [Alert, Awake, Oriented as Age-Appropriate] : alert, awake, oriented as age appropriate

## 2020-12-03 NOTE — REASON FOR VISIT
[Routine Follow-Up] : a routine follow-up visit for [Asthma] : asthma [Cough] : cough [FreeTextEntry2] : baseline spirometry

## 2020-12-03 NOTE — HISTORY OF PRESENT ILLNESS
[de-identified] : 39 year old male with recurrent winter cough, allergic rhinitis and allergic conjunctivitis presenting for baseline spirometry for evaluation of cough variant persistent asthma. Pt has been using his QVAR inhaler as prescribed and has noted improvement in his breathing. Has not required use of his emergency inhaler since his prior visit. Denies any episodes of coughing since his last visit. \par \par INITIAL HISTORY: Patient had a dry cough; he was evaluated in the ER. His COVID test and chest xray was wnl. He was placed on oral steroids, Tessalon perles and albuterol with improvement of symptoms. Once week after completion of oral steroid, the cough slowly restarted. There is associated wheezing and chest tightness, which temporarily improves with albuterol. For the past six to seven years the patient has had similar cough during winter which improves with warm weather. Last year the patient, had similar cough he was placed on oral antihistamine, nasal steroid with minimal help. He was never diagnosed with asthma. \par \par History of cough from last year. \par  The patient was seen for the first time in ER, two months for a bothersome cough. In the ER, the patient had a Chest X Ray which was thought to be normal. Prior to this, the patient was seen by Dr. Bailey Lynne and treated with several antihistamines with only partial relief; the patient was also treated prior, with intranasal steroids, which again were partially helpful only. The symptoms of cough partially improved after the ER visit with no clear treatment, but worsened, at which point he was re evaluated diagnosed with influenza and supportive care was suggested. \par \par There is associated pruritus of the eyes, nasal congestion, and post nasal drip that are associated with the cough. There is also associated shortness of breath with flares of the cough with intermittent wheeze that has been noted. The cough is reported to occur every winter season for 8 years, and lasting for several months. In the past, when patient has been treated with oral antihistamines and intranasal steroids with improvement of the symptoms.

## 2020-12-03 NOTE — IMPRESSION
[Spirometry] : Spirometry [Moderate] : (moderate) [Reversible] :  with reversibility. [Mild] : (mild)

## 2021-01-06 ENCOUNTER — APPOINTMENT (OUTPATIENT)
Dept: PEDIATRIC ALLERGY IMMUNOLOGY | Facility: CLINIC | Age: 41
End: 2021-01-06

## 2021-03-05 NOTE — ED PROVIDER NOTE - PRINCIPAL DIAGNOSIS
How Severe Are Your Spot(S)?: mild
What Is The Reason For Today's Visit?: Upper Body Skin Exam
Cough

## 2022-08-21 NOTE — ED ADULT TRIAGE NOTE - TEMPERATURE IN CELSIUS (DEGREES C)
Problem: Respiratory - Adult  Goal: Achieves optimal ventilation and oxygenation  Outcome: Progressing  Flowsheets (Taken 8/21/2022 1883)  Achieves optimal ventilation and oxygenation:   Assess for changes in respiratory status   Position to facilitate oxygenation and minimize respiratory effort   Assess the need for suctioning and aspirate as needed   Respiratory therapy support as indicated   Assess for changes in mentation and behavior   Oxygen supplementation based on oxygen saturation or arterial blood gases 36.7

## 2022-11-04 ENCOUNTER — EMERGENCY (EMERGENCY)
Facility: HOSPITAL | Age: 42
LOS: 1 days | Discharge: HOME | End: 2022-11-04
Attending: STUDENT IN AN ORGANIZED HEALTH CARE EDUCATION/TRAINING PROGRAM | Admitting: STUDENT IN AN ORGANIZED HEALTH CARE EDUCATION/TRAINING PROGRAM

## 2022-11-04 VITALS
TEMPERATURE: 98 F | SYSTOLIC BLOOD PRESSURE: 124 MMHG | HEART RATE: 70 BPM | DIASTOLIC BLOOD PRESSURE: 63 MMHG | RESPIRATION RATE: 16 BRPM | OXYGEN SATURATION: 100 %

## 2022-11-04 LAB

## 2022-11-04 PROCEDURE — 99284 EMERGENCY DEPT VISIT MOD MDM: CPT

## 2022-11-04 NOTE — ED PROVIDER NOTE - OBJECTIVE STATEMENT
42M, no sig pmh, who presents with viral like sxs. For the past 2-3 days, reports the following constellation of sxs - intermittent fever, dry cough, sore throat, and myalgias. Has not trialed any NSAIDs/APAP. Tolerating PO. Stooling/voiding without any issues. No neck stiffness. No rash. No photophobia. No chest pain, sob, belly pain, nvd, dysuria, hematuria, recent travel, trauma, syncope.

## 2022-11-04 NOTE — ED PROVIDER NOTE - PATIENT PORTAL LINK FT
You can access the FollowMyHealth Patient Portal offered by [facility] by registering at the following website: [email or web address]. By joining [employer]’s FollowMyHealth portal, you will also be able to view your health information using other applications (apps) compatible with our system.

## 2022-11-04 NOTE — ED PROVIDER NOTE - DISCHARGE DATE
04-Nov-2022 Bilobed Transposition Flap Text: The defect edges were debeveled with a #15 scalpel blade.  Given the location of the defect and the proximity to free margins a bilobed transposition flap was deemed most appropriate.  Using a sterile surgical marker, an appropriate bilobe flap drawn around the defect.    The area thus outlined was incised deep to adipose tissue with a #15 scalpel blade.  The skin margins were undermined to an appropriate distance in all directions utilizing iris scissors.

## 2022-11-04 NOTE — ED PROVIDER NOTE - NSFOLLOWUPINSTRUCTIONS_ED_ALL_ED_FT
YOU WERE SEEN FOR fever and myalgias.    TAKE TYLENOL 650mg EVERY 4 HOURS AS NEEDED FOR MILD PAIN  TAKE MOTRIN 600mg EVERY 6 HOURS AS NEEDED FOR MODERATE PAIN    FOLLOW UP WITH YOUR PRIMARY CARE PROVIDER WITHIN 1 WEEK.    RETURN TO THE EMERGENCY DEPARTMENT FOR ANY WORSENING SYMPTOMS.

## 2022-11-04 NOTE — ED ADULT NURSE NOTE - OBJECTIVE STATEMENT
Received patient in Intake 2 c/o fever, headache, body ache, cough x 3 days, patient denies SOB, chest pain. Patient is A&OX4, airway patent, breathing unlabored and even, radial pulses palpable, abdomen soft, nontender. Labs obtained. Side rails up and safety maintained.

## 2022-11-04 NOTE — ED PROVIDER NOTE - CLINICAL SUMMARY MEDICAL DECISION MAKING FREE TEXT BOX
42M, no sig pmh, presenting with viral URI sxs for the past 2-3 days. Afebrile, well appearing here. Nothing appreciated on physical exam. Tolerating PO. Stooling/voiding without any issues. Neck supple, no rash. No suspicion for serious bacterial infection or meningitis at this time. Will swab and D/C with strict return precautions.

## 2023-09-13 NOTE — ED PROVIDER NOTE - CONSTITUTIONAL NEGATIVE STATEMENT, MLM
no fever and no chills.
POST-OP DIAGNOSIS:  Osteoarthritis of right knee 13-Sep-2023 17:42:15  Kate Ng  
POST-OP DIAGNOSIS:  Osteoarthritis of left knee 13-Sep-2023 18:08:01  Kate Ng

## 2024-05-16 NOTE — ED PROVIDER NOTE - PROGRESS NOTE DETAILS
2
[place]: Discussed a stress test with the patient, and he does not wish to stay.  He will follow up with the rapid follow up cardiology office to schedule a visit this week.  His pain is improved.

## 2024-06-13 NOTE — ED PROVIDER NOTE - NSFOLLOWUPINSTRUCTIONS_ED_ALL_ED_FT
----- Message from JENNY Jett sent at 6/13/2024  8:54 AM CDT -----  CT head shows no acute injury or new changes. CT neck with no evidence of fracture, there is severe arthritis present. Continue Tylenol and lidocaine patch, tramadol for severe pain. Can also consider PT if pain is not improving. Call tomorrow with home BPs as discussed.    Patient advised to follow up with PRIMARY CARE DOCTOR IN 1-2 DAYS AND ALLERGIST AND PULMONOLOGIST  and told to return to the emergency department immediately for any new or concerning symptoms such as shortness of breath, weakness, dizziness, chest pain, fevers, chills, OR ANY OTHER COMPLAINTS. Patient agrees with plan.    Take medication as directed   Use nebulizer every 4-6 hours as needed for wheezing/shortness of breath   Follow up with specialist     Advance activity as tolerated.  Continue all previously prescribed medications as directed unless otherwise instructed.  Follow up with your primary care physician in 48-72 hours- bring copies of your results.  Return to the ER for worsening or persistent symptoms, and/or ANY NEW OR CONCERNING SYMPTOMS. If you have issues obtaining follow up, please call: [phone] to obtain a doctor or specialist who takes your insurance in your area.  You may call [phone] to make an appointment with the internal medicine clinic.

## 2025-07-24 NOTE — ED ADULT TRIAGE NOTE - ACCOMPANIED BY
Reviewed urgent care visit 6/2025, no imaging, prescribed flexeril, motrin, recommended heat/lidocaine patches  Suspected to be muscular, likely exacerbated by stress  Has since started chiropractor care who is working on improving strength, posture, ROM  Has some radiculopathy symptoms that are improving with exercises with chiropractor  Would benefit from physical therapy, referral given  If not improved 6-8 wks discuss imaging   Self